# Patient Record
Sex: MALE | Race: WHITE | HISPANIC OR LATINO | Employment: FULL TIME | URBAN - METROPOLITAN AREA
[De-identification: names, ages, dates, MRNs, and addresses within clinical notes are randomized per-mention and may not be internally consistent; named-entity substitution may affect disease eponyms.]

---

## 2017-06-16 ENCOUNTER — HOSPITAL ENCOUNTER (OUTPATIENT)
Facility: MEDICAL CENTER | Age: 22
End: 2017-06-16
Attending: FAMILY MEDICINE
Payer: COMMERCIAL

## 2017-06-16 ENCOUNTER — HOSPITAL ENCOUNTER (OUTPATIENT)
Dept: RADIOLOGY | Facility: MEDICAL CENTER | Age: 22
End: 2017-06-16
Attending: FAMILY MEDICINE
Payer: COMMERCIAL

## 2017-06-16 ENCOUNTER — OFFICE VISIT (OUTPATIENT)
Dept: URGENT CARE | Facility: PHYSICIAN GROUP | Age: 22
End: 2017-06-16
Payer: COMMERCIAL

## 2017-06-16 VITALS
DIASTOLIC BLOOD PRESSURE: 70 MMHG | HEART RATE: 58 BPM | BODY MASS INDEX: 26.51 KG/M2 | RESPIRATION RATE: 16 BRPM | TEMPERATURE: 98.8 F | OXYGEN SATURATION: 98 % | SYSTOLIC BLOOD PRESSURE: 144 MMHG | WEIGHT: 190 LBS

## 2017-06-16 DIAGNOSIS — Z11.3 SCREENING FOR STD (SEXUALLY TRANSMITTED DISEASE): ICD-10-CM

## 2017-06-16 DIAGNOSIS — N50.812 PAIN IN LEFT TESTICLE: ICD-10-CM

## 2017-06-16 DIAGNOSIS — R30.0 DYSURIA: ICD-10-CM

## 2017-06-16 DIAGNOSIS — I86.1 LEFT VARICOCELE: ICD-10-CM

## 2017-06-16 PROCEDURE — 76870 US EXAM SCROTUM: CPT

## 2017-06-16 PROCEDURE — 99214 OFFICE O/P EST MOD 30 MIN: CPT | Performed by: FAMILY MEDICINE

## 2017-06-16 PROCEDURE — 87491 CHLMYD TRACH DNA AMP PROBE: CPT

## 2017-06-16 PROCEDURE — 87591 N.GONORRHOEAE DNA AMP PROB: CPT

## 2017-06-16 ASSESSMENT — ENCOUNTER SYMPTOMS
EYE REDNESS: 0
MYALGIAS: 0
EYE DISCHARGE: 0

## 2017-06-16 NOTE — MR AVS SNAPSHOT
Mino Valladares   2017 10:30 AM   Office Visit   MRN: [id number]    Department:  [facility]   Dept Phone:  [phone]    Description:  Male : 1995   Provider:  Michael Hernandez M.D.           Reason for Visit     Testicular Pain pt states has testicular pressure L side x 3 weeks      Allergies as of 2017     No Known Allergies      You were diagnosed with     Pain in left testicle   [369099]       Dysuria   [788.1.ICD-9-CM]       Screening for STD (sexually transmitted disease)   [235945]         Vital Signs     Blood Pressure Pulse Temperature Respirations Weight Oxygen Saturation    144/70 mmHg 58 37.1 °C (98.8 °F) 16 86.183 kg (190 lb) 98%    Smoking Status                   Never Smoker            Basic Information     Date Of Birth Sex Race Ethnicity Preferred Language    1995 Male  or   Origin (Armenian,Angolan,Equatorial Guinean,Andorran, etc) English      Your appointments     2017  2:30 PM   [facility]   [facility]ING [place] ([place]    [address]  [facility] [address]   [phone]              Problem List              ICD-10-CM Priority Class Noted - Resolved    No active medical problems [id number]   Unknown - Present    Left ankle injury S99.912A   3/3/2014 - Present      Health Maintenance        Date Due Completion Dates    IMM HEP B VACCINE (1 of 3 - Primary Series) 1993 ---    IMM HEP A VACCINE (1 of 2 - Standard Series) 1994 ---    IMM HPV VACCINE (1 of 3 - Male 3 Dose Series) 2004 ---    IMM VARICELLA (CHICKENPOX) VACCINE (1 of 2 - 2 Dose Adolescent Series) 2006 ---    IMM DTaP/Tdap/Td Vaccine (1 - Tdap) 2012 ---            Current Immunizations     No immunizations on file.      Below and/or attached are the medications your provider expects you to take. Review all of your home medications and newly ordered medications with your provider and/or pharmacist. Follow medication  instructions as directed by your provider and/or pharmacist. Please keep your medication list with you and share with your provider. Update the information when medications are discontinued, doses are changed, or new medications (including over-the-counter products) are added; and carry medication information at all times in the event of emergency situations     Allergies:  No Known Allergies          Medications  Valid as of: June 16, 2017 - 12:11 PM    Generic Name Brand Name Tablet Size Instructions for use    Amoxicillin (Tab) AMOXIL 875 MG Take 1 Tab by mouth 2 times a day.        Hydrocodone-Acetaminophen (Tab) NORCO 5-325 MG Take 1-2 Tabs by mouth every four hours as needed.        .                 Medicines prescribed today were sent to:     [facility] #[id number] - [place], [place] - [address]    [address] [facility]. [place] [address]    Phone: [phone] Fax: [phone]    Open 24 Hours?: No      Medication refill instructions:       If your prescription bottle indicates you have medication refills left, it is not necessary to call your provider’s office. Please contact your pharmacy and they will refill your medication.    If your prescription bottle indicates you do not have any refills left, you may request refills at any time through one of the following ways: The online [employer] system (except Urgent Care), by calling your provider’s office, or by asking your pharmacy to contact your provider’s office with a refill request. Medication refills are processed only during regular business hours and may not be available until the next business day. Your provider may request additional information or to have a follow-up visit with you prior to refilling your medication.   *Please Note: Medication refills are assigned a new Rx number when refilled electronically. Your pharmacy may indicate that no refills were authorized even though a new prescription for the same medication is available at the pharmacy. Please  request the medicine by name with the pharmacy before contacting your provider for a refill.        Your To Do List     Future Labs/Procedures Complete By Expires    CHLAMYDIA/GC PCR URINE OR SWAB  As directed 6/16/2018    [id number]  As directed 6/16/2018         "[employer] Access Code: [email or web address]  Expires: 7/16/2017 12:11 PM    "[employer]  A secure, online tool to manage your health information     [employer]’s "[employer]® is a secure, online tool that connects you to your personalized health information from the privacy of your home -- day or night - making it very easy for you to manage your healthcare. Once the activation process is completed, you can even access your medical information using the "[employer] britton, which is available for free in the Apple Britton store or Google Play store.     "[employer] provides the following levels of access (as shown below):   My Chart Features   [facility]  Specialists [facility]  Urgent  [facility]  Primary Care  Doctor   Email your healthcare team securely and privately 24/7 X X X    Manage appointments: schedule your next appointment; view details of past/upcoming appointments X      Request prescription refills. X      View recent personal medical records, including lab and immunizations X X X X   View health record, including health history, allergies, medications X X X X   Read reports about your outpatient visits, procedures, consult and ER notes X X X X   See your discharge summary, which is a recap of your hospital and/or ER visit that includes your diagnosis, lab results, and care plan. X X       How to register for "[employer]:  1. Go to  [email or web address].  2. Click on the Sign Up Now box, which takes you to the New Member Sign Up page. You will need to provide the following information:  a. Enter your "[employer] Access Code exactly as it appears at the top of this page. (You will not need to use this code after you’ve completed the  sign-up process. If you do not sign up before the expiration date, you must request a new code.)   b. Enter your date of birth.   c. Enter your home email address.   d. Click Submit, and follow the next screen’s instructions.  3. Create a [employer] ID. This will be your [employer] login ID and cannot be changed, so think of one that is secure and easy to remember.  4. Create a [employer] password. You can change your password at any time.  5. Enter your Password Reset Question and Answer. This can be used at a later time if you forget your password.   6. Enter your e-mail address. This allows you to receive e-mail notifications when new information is available in [employer].  7. Click Sign Up. You can now view your health information.    For assistance activating your [employer] account, call [phone]

## 2017-06-16 NOTE — PROGRESS NOTES
Subjective:      Mino Valladares is a 22 y.o. male who presents with Testicular Pain            HPI  3 weeks intermittent left testicle pain. Pressure character. 4/10 at worse. No trauma. No clear trigger. He did have unprotected sex approx 8 months ago and concerned for STD. No penis discharge. No blood in semen or urine. There is mild urinary burning. No fever.   No mass or swelling.     Review of Systems   Eyes: Negative for discharge and redness.   Musculoskeletal: Negative for myalgias and joint pain.   Skin: Negative for itching and rash.   .  Medications, Allergies, and current problem list reviewed today in Epic         Objective:     /70 mmHg  Pulse 58  Temp(Src) 37.1 °C (98.8 °F)  Resp 16  Wt 86.183 kg (190 lb)  SpO2 98%     Physical Exam   Constitutional: He appears well-developed and well-nourished. No distress.   Neurological:   Speech is clear. Patient is appropriate and cooperative.                 Assessment/Plan:     US: varicocele per radiology    1. Pain in left testicle  [id number]   2. Dysuria  POCT Urinalysis   3. Screening for STD (sexually transmitted disease)  CHLAMYDIA/GC PCR URINE OR SWAB   4. Left varicocele  REFERRAL TO UROLOGY     Differential diagnosis, natural history, supportive care, and indications for immediate follow-up discussed at length.   Will f/u labs

## 2017-06-19 LAB
C TRACH DNA SPEC QL NAA+PROBE: NEGATIVE
N GONORRHOEA DNA SPEC QL NAA+PROBE: NEGATIVE
SPECIMEN SOURCE: NORMAL

## 2018-03-05 ENCOUNTER — HOSPITAL ENCOUNTER (OUTPATIENT)
Dept: LAB | Facility: MEDICAL CENTER | Age: 23
End: 2018-03-05
Attending: FAMILY MEDICINE
Payer: COMMERCIAL

## 2018-03-05 ENCOUNTER — OFFICE VISIT (OUTPATIENT)
Dept: URGENT CARE | Facility: PHYSICIAN GROUP | Age: 23
End: 2018-03-05
Payer: COMMERCIAL

## 2018-03-05 VITALS
HEART RATE: 62 BPM | DIASTOLIC BLOOD PRESSURE: 80 MMHG | TEMPERATURE: 98.7 F | OXYGEN SATURATION: 99 % | RESPIRATION RATE: 16 BRPM | WEIGHT: 211 LBS | BODY MASS INDEX: 29.54 KG/M2 | HEIGHT: 71 IN | SYSTOLIC BLOOD PRESSURE: 138 MMHG

## 2018-03-05 DIAGNOSIS — R10.13 EPIGASTRIC ABDOMINAL PAIN: ICD-10-CM

## 2018-03-05 DIAGNOSIS — K85.90 ACUTE PANCREATITIS, UNSPECIFIED COMPLICATION STATUS, UNSPECIFIED PANCREATITIS TYPE: ICD-10-CM

## 2018-03-05 LAB
ALBUMIN SERPL BCP-MCNC: 4.7 G/DL (ref 3.2–4.9)
ALBUMIN/GLOB SERPL: 1.4 G/DL
ALP SERPL-CCNC: 64 U/L (ref 30–99)
ALT SERPL-CCNC: 19 U/L (ref 2–50)
ANION GAP SERPL CALC-SCNC: 9 MMOL/L (ref 0–11.9)
AST SERPL-CCNC: 19 U/L (ref 12–45)
BASOPHILS # BLD AUTO: 0.6 % (ref 0–1.8)
BASOPHILS # BLD: 0.04 K/UL (ref 0–0.12)
BILIRUB SERPL-MCNC: 0.6 MG/DL (ref 0.1–1.5)
BUN SERPL-MCNC: 17 MG/DL (ref 8–22)
CALCIUM SERPL-MCNC: 9.6 MG/DL (ref 8.5–10.5)
CHLORIDE SERPL-SCNC: 103 MMOL/L (ref 96–112)
CO2 SERPL-SCNC: 28 MMOL/L (ref 20–33)
CREAT SERPL-MCNC: 1 MG/DL (ref 0.5–1.4)
EOSINOPHIL # BLD AUTO: 0.15 K/UL (ref 0–0.51)
EOSINOPHIL NFR BLD: 2.3 % (ref 0–6.9)
ERYTHROCYTE [DISTWIDTH] IN BLOOD BY AUTOMATED COUNT: 41.9 FL (ref 35.9–50)
GLOBULIN SER CALC-MCNC: 3.3 G/DL (ref 1.9–3.5)
GLUCOSE SERPL-MCNC: 86 MG/DL (ref 65–99)
HCT VFR BLD AUTO: 47.9 % (ref 42–52)
HGB BLD-MCNC: 15.6 G/DL (ref 14–18)
IMM GRANULOCYTES # BLD AUTO: 0.01 K/UL (ref 0–0.11)
IMM GRANULOCYTES NFR BLD AUTO: 0.2 % (ref 0–0.9)
LIPASE SERPL-CCNC: 568 U/L (ref 11–82)
LYMPHOCYTES # BLD AUTO: 1.88 K/UL (ref 1–4.8)
LYMPHOCYTES NFR BLD: 28.6 % (ref 22–41)
MCH RBC QN AUTO: 30.5 PG (ref 27–33)
MCHC RBC AUTO-ENTMCNC: 32.6 G/DL (ref 33.7–35.3)
MCV RBC AUTO: 93.7 FL (ref 81.4–97.8)
MONOCYTES # BLD AUTO: 0.53 K/UL (ref 0–0.85)
MONOCYTES NFR BLD AUTO: 8.1 % (ref 0–13.4)
NEUTROPHILS # BLD AUTO: 3.96 K/UL (ref 1.82–7.42)
NEUTROPHILS NFR BLD: 60.2 % (ref 44–72)
NRBC # BLD AUTO: 0 K/UL
NRBC BLD-RTO: 0 /100 WBC
PLATELET # BLD AUTO: 249 K/UL (ref 164–446)
PMV BLD AUTO: 10.1 FL (ref 9–12.9)
POTASSIUM SERPL-SCNC: 4.1 MMOL/L (ref 3.6–5.5)
PROT SERPL-MCNC: 8 G/DL (ref 6–8.2)
RBC # BLD AUTO: 5.11 M/UL (ref 4.7–6.1)
SODIUM SERPL-SCNC: 140 MMOL/L (ref 135–145)
WBC # BLD AUTO: 6.6 K/UL (ref 4.8–10.8)

## 2018-03-05 PROCEDURE — 80053 COMPREHEN METABOLIC PANEL: CPT

## 2018-03-05 PROCEDURE — 99214 OFFICE O/P EST MOD 30 MIN: CPT | Performed by: FAMILY MEDICINE

## 2018-03-05 PROCEDURE — 85025 COMPLETE CBC W/AUTO DIFF WBC: CPT

## 2018-03-05 PROCEDURE — 83690 ASSAY OF LIPASE: CPT

## 2018-03-05 PROCEDURE — 36415 COLL VENOUS BLD VENIPUNCTURE: CPT

## 2018-03-05 RX ORDER — OMEPRAZOLE 20 MG/1
20 CAPSULE, DELAYED RELEASE ORAL 2 TIMES DAILY
Qty: 28 CAP | Refills: 0 | Status: SHIPPED | OUTPATIENT
Start: 2018-03-05 | End: 2018-03-19

## 2018-03-05 ASSESSMENT — PATIENT HEALTH QUESTIONNAIRE - PHQ9: CLINICAL INTERPRETATION OF PHQ2 SCORE: 0

## 2018-03-05 ASSESSMENT — ENCOUNTER SYMPTOMS
SENSORY CHANGE: 0
FOCAL WEAKNESS: 0
WEIGHT LOSS: 0

## 2018-03-05 NOTE — PROGRESS NOTES
"Subjective:      Mino Valladares is a 22 y.o. male who presents with Abdominal Pain (upper stomach pain with acid reflex)            1 week epigastric abdominal pain. Excessive belching. Acid reflux. Worse with eating and sleeping on stomach.  6-7/10. No radiation. No melena. No PMH upper gi problems. No melena. No fever. No jaundice. No EtOH. OTC tums with some relief.   No other aggravating or alleviating factors.           Review of Systems   Constitutional: Negative for malaise/fatigue and weight loss.   Neurological: Negative for sensory change and focal weakness.     .  Medications, Allergies, and current problem list reviewed today in Epic       Objective:     /80   Pulse 62   Temp 37.1 °C (98.7 °F)   Resp 16   Ht 1.803 m (5' 11\")   Wt 95.7 kg (211 lb)   SpO2 99%   BMI 29.43 kg/m²      Physical Exam   Constitutional: He appears well-developed and well-nourished. No distress.   HENT:   Head: Normocephalic and atraumatic.   Eyes: Conjunctivae are normal.   Cardiovascular: Normal rate, regular rhythm and normal heart sounds.    Pulmonary/Chest: Effort normal and breath sounds normal.   Abdominal: Soft. He exhibits no mass. There is tenderness (epigastric). There is no rebound and no guarding.   Neurological:   Speech is clear. Patient is appropriate and cooperative.     Skin: Skin is warm and dry. No rash noted.               Assessment/Plan:     1. Epigastric abdominal pain  COMP METABOLIC PANEL    LIPASE    CBC WITH DIFFERENTIAL    omeprazole (PRILOSEC) 20 MG delayed-release capsule     Differential diagnosis, natural history, supportive care, and indications for immediate follow-up discussed at length.   Suspect GERD  Will initiate PPI and f/u labs  If persistent will refer to GI for further evaluation.   "

## 2018-03-06 ENCOUNTER — HOSPITAL ENCOUNTER (EMERGENCY)
Facility: MEDICAL CENTER | Age: 23
End: 2018-03-06
Attending: EMERGENCY MEDICINE
Payer: COMMERCIAL

## 2018-03-06 ENCOUNTER — APPOINTMENT (OUTPATIENT)
Dept: RADIOLOGY | Facility: MEDICAL CENTER | Age: 23
End: 2018-03-06
Attending: EMERGENCY MEDICINE
Payer: COMMERCIAL

## 2018-03-06 VITALS
TEMPERATURE: 98 F | HEART RATE: 55 BPM | OXYGEN SATURATION: 98 % | SYSTOLIC BLOOD PRESSURE: 141 MMHG | DIASTOLIC BLOOD PRESSURE: 82 MMHG | RESPIRATION RATE: 16 BRPM

## 2018-03-06 DIAGNOSIS — K85.90 ACUTE PANCREATITIS, UNSPECIFIED COMPLICATION STATUS, UNSPECIFIED PANCREATITIS TYPE: ICD-10-CM

## 2018-03-06 LAB
ALBUMIN SERPL BCP-MCNC: 4.6 G/DL (ref 3.2–4.9)
ALBUMIN/GLOB SERPL: 1.4 G/DL
ALP SERPL-CCNC: 61 U/L (ref 30–99)
ALT SERPL-CCNC: 14 U/L (ref 2–50)
ANION GAP SERPL CALC-SCNC: 8 MMOL/L (ref 0–11.9)
AST SERPL-CCNC: 16 U/L (ref 12–45)
BASOPHILS # BLD AUTO: 0.3 % (ref 0–1.8)
BASOPHILS # BLD: 0.02 K/UL (ref 0–0.12)
BILIRUB SERPL-MCNC: 0.5 MG/DL (ref 0.1–1.5)
BUN SERPL-MCNC: 19 MG/DL (ref 8–22)
CALCIUM SERPL-MCNC: 9.3 MG/DL (ref 8.5–10.5)
CHLORIDE SERPL-SCNC: 105 MMOL/L (ref 96–112)
CO2 SERPL-SCNC: 26 MMOL/L (ref 20–33)
CREAT SERPL-MCNC: 0.91 MG/DL (ref 0.5–1.4)
EOSINOPHIL # BLD AUTO: 0.23 K/UL (ref 0–0.51)
EOSINOPHIL NFR BLD: 3.9 % (ref 0–6.9)
ERYTHROCYTE [DISTWIDTH] IN BLOOD BY AUTOMATED COUNT: 40 FL (ref 35.9–50)
GLOBULIN SER CALC-MCNC: 3.3 G/DL (ref 1.9–3.5)
GLUCOSE SERPL-MCNC: 88 MG/DL (ref 65–99)
HCT VFR BLD AUTO: 44.7 % (ref 42–52)
HGB BLD-MCNC: 15.4 G/DL (ref 14–18)
IMM GRANULOCYTES # BLD AUTO: 0.02 K/UL (ref 0–0.11)
IMM GRANULOCYTES NFR BLD AUTO: 0.3 % (ref 0–0.9)
LIPASE SERPL-CCNC: 393 U/L (ref 11–82)
LYMPHOCYTES # BLD AUTO: 1.6 K/UL (ref 1–4.8)
LYMPHOCYTES NFR BLD: 26.9 % (ref 22–41)
MCH RBC QN AUTO: 31.6 PG (ref 27–33)
MCHC RBC AUTO-ENTMCNC: 34.5 G/DL (ref 33.7–35.3)
MCV RBC AUTO: 91.8 FL (ref 81.4–97.8)
MONOCYTES # BLD AUTO: 0.51 K/UL (ref 0–0.85)
MONOCYTES NFR BLD AUTO: 8.6 % (ref 0–13.4)
NEUTROPHILS # BLD AUTO: 3.56 K/UL (ref 1.82–7.42)
NEUTROPHILS NFR BLD: 60 % (ref 44–72)
NRBC # BLD AUTO: 0 K/UL
NRBC BLD-RTO: 0 /100 WBC
PLATELET # BLD AUTO: 219 K/UL (ref 164–446)
PMV BLD AUTO: 9.9 FL (ref 9–12.9)
POTASSIUM SERPL-SCNC: 3.5 MMOL/L (ref 3.6–5.5)
PROT SERPL-MCNC: 7.9 G/DL (ref 6–8.2)
RBC # BLD AUTO: 4.87 M/UL (ref 4.7–6.1)
SODIUM SERPL-SCNC: 139 MMOL/L (ref 135–145)
WBC # BLD AUTO: 5.9 K/UL (ref 4.8–10.8)

## 2018-03-06 PROCEDURE — 83690 ASSAY OF LIPASE: CPT

## 2018-03-06 PROCEDURE — 76705 ECHO EXAM OF ABDOMEN: CPT

## 2018-03-06 PROCEDURE — 85025 COMPLETE CBC W/AUTO DIFF WBC: CPT

## 2018-03-06 PROCEDURE — 99284 EMERGENCY DEPT VISIT MOD MDM: CPT

## 2018-03-06 PROCEDURE — 80053 COMPREHEN METABOLIC PANEL: CPT

## 2018-03-06 ASSESSMENT — PAIN SCALES - GENERAL: PAINLEVEL_OUTOF10: 0

## 2018-03-07 NOTE — ED TRIAGE NOTES
"PCP sent pt due to pt possibly having GERD or \"pancreatitis.\" Pt denies pain, vomiting, diarrhea. C/o heartburn and bloating x1 week. Pt in no apparent distress. States intermittent nausea with frequent belching, denies at this time.   "

## 2018-03-07 NOTE — ED NOTES
"Pt states they are feeling \"ok.\" Updated on plan of care, pt expressed understanding. No other complaints at this time.     "

## 2018-03-07 NOTE — DISCHARGE INSTRUCTIONS
Acute Pancreatitis  Acute pancreatitis is a condition in which the pancreas suddenly becomes irritated and swollen (has inflammation). The pancreas is a gland that is located behind the stomach. It produces enzymes that help to digest food. The pancreas also releases the hormones glucagon and insulin, which help to regulate blood sugar. Damage to the pancreas occurs when the digestive enzymes from the pancreas are activated before they are released into the intestine.  Most acute attacks last a couple of days and can cause serious problems. Some people become dehydrated and develop low blood pressure. In severe cases, bleeding into the pancreas can lead to shock and can be life-threatening. The lungs, heart, and kidneys may fail.  What are the causes?  The most common causes of this condition are:  · Alcohol abuse.  · Gallstones.  Other causes include:  · Certain medicines.  · Exposure to certain chemicals.  · Infection.  · Damage caused by an accident (trauma).  · Abdominal surgery.  In some cases, the cause may not be known.  What are the signs or symptoms?  Symptoms of this condition include:  · Pain in the upper abdomen that may radiate to the back.  · Tenderness and swelling of the abdomen.  · Nausea and vomiting.  How is this diagnosed?  This condition may be diagnosed based on:  · A physical exam.  · Blood tests.  · Imaging tests, such as X-rays, CT scans, or an ultrasound of the abdomen.  How is this treated?  Treatment for this condition usually requires a stay in the hospital. Treatment may include:  · Pain medicine.  · Fluid replacement through an IV tube.  · Placing a tube in the stomach to remove stomach contents and to control vomiting (NG tube, or nasogastric tube).  · Not eating for 3-4 days. This gives the pancreas a rest, because enzymes are not being produced that can cause further damage.  · Antibiotic medicines, if your condition is caused by an infection.  · Surgery on the pancreas or  gallbladder.  Follow these instructions at home:  Eating and drinking  · Follow instructions from your health care provider about diet. This may involve avoiding alcohol and decreasing the amount of fat in your diet.  · Eat smaller, more frequent meals. This reduces the amount of digestive fluids that the pancreas produces.  · Drink enough fluid to keep your urine clear or pale yellow.  · Do not drink alcohol if it caused your condition.  General instructions  · Take over-the-counter and prescription medicines only as told by your health care provider.  · Do not use any tobacco products, such as cigarettes, chewing tobacco, and e-cigarettes. If you need help quitting, ask your health care provider.  · Get plenty of rest.  · If directed, check your blood sugar at home as told by your health care provider.  · Keep all follow-up visits as told by your health care provider. This is important.  Contact a health care provider if:  · You do not recover as quickly as expected.  · You develop new or worsening symptoms.  · You have persistent pain, weakness, or nausea.  · You recover and then have another episode of pain.  · You have a fever.  Get help right away if:  · You cannot eat or keep fluids down.  · Your pain becomes severe.  · Your skin or the white part of your eyes turns yellow (jaundice).  · You vomit.  · You feel dizzy or you faint.  · Your blood sugar is high (over 300 mg/dL).  This information is not intended to replace advice given to you by your health care provider. Make sure you discuss any questions you have with your health care provider.  Document Released: 12/18/2006 Document Revised: 04/26/2017 Document Reviewed: 09/20/2016  [employer] Patient Education © 2017 [employer]

## 2018-03-07 NOTE — ED NOTES
Nurse introduced self to pt. Updated on plan of care, what was to be expected. Pt expressed understanding. Call light within reach, siderail up x1 for safety.

## 2018-03-07 NOTE — ED PROVIDER NOTES
ED Provider Note    ED Provider Note      Primary care provider: Helen Kaur M.D.    CHIEF COMPLAINT  Chief Complaint   Patient presents with   • Gastrophageal Reflux   • Bloating       HPI  Mino Valladares is a 22 y.o. male who presents to the Emergency Department with chief complaint of upper epigastric abdominal pain. Patient was seen in urgent care yesterday diagnosed with indigestion discharged on omeprazole. Was called by urgent care today and directed go to the emergency department as his blood work revealed pancreatitis. Patient states he's had previous issues with blood in his stool states that this was caused by constipation exam no other GI issues portion of previous pancreas issues has had no gallbladder issues does report that he's had history of hypercholesterolemia. Patient states the pain is actually improving at this time no nausea vomiting he's had no aspiration diarrhea no dysuria no fevers no chills he does not drink heavily.    REVIEW OF SYSTEMS  10 systems reviewed and otherwise negative, pertinent positives and negatives listed in the history of present illness.    PAST MEDICAL HISTORY   has a past medical history of Left ankle injury (3/3/2014) and No active medical problems.    SURGICAL HISTORY   has a past surgical history that includes ankle orif (8/19/2010).    SOCIAL HISTORY  Social History   Substance Use Topics   • Smoking status: Never Smoker   • Smokeless tobacco: Never Used   • Alcohol use No      Comment: Stopped 2.5 months ago but was only socially before      History   Drug Use No     Comment: tried marijuana       FAMILY HISTORY  Non-Contributory    CURRENT MEDICATIONS  Home Medications     Reviewed by Qian Hart R.N. (Registered Nurse) on 03/06/18 at 1955  Med List Status: Complete   Medication Last Dose Status   amoxicillin (AMOXIL) 875 MG tablet  Active   hydrocodone-acetaminophen (NORCO) 5-325 MG TABS per tablet Not Taking Active   omeprazole (PRILOSEC) 20 MG  delayed-release capsule 3/6/2018 Active                ALLERGIES  No Known Allergies    PHYSICAL EXAM  VITAL SIGNS: /63   Pulse 74   Temp 36.9 °C (98.4 °F) (Temporal)   Resp 16   SpO2 98%   Pulse ox interpretation: I interpret this pulse ox as normal.  Constitutional: Alert and oriented x 3, minimal Distress  HEENT: Atraumatic normocephalic, pupils are equal round reactive to light extraocular movements are intact. The nares is clear, external ears are normal, mouth shows moist mucous membranes  Neck: Supple, no JVD no tracheal deviation  Cardiovascular: Regular rate and rhythm no murmur rub or gallop 2+ pulses peripherally x4  Thorax & Lungs: No respiratory distress, no wheezes rales or rhonchi, No chest tenderness.   GI: Minimal tenderness in epigastrium no rebound or guarding positive bowel sounds nondistended  Skin: Warm dry no acute rash or lesion  Musculoskeletal: Moving all extremities with full range and 5 of 5 strength, no acute  deformity  Neurologic: Cranial nerves III through XII are grossly intact, no sensory deficit, no cerebellar dysfunction   Psychiatric: Appropriate affect for situation at this time      DIAGNOSTIC STUDIES / PROCEDURES  LABS    Reviewed from urgent care yesterday.  Results for orders placed or performed during the hospital encounter of 03/05/18   COMP METABOLIC PANEL   Result Value Ref Range    Sodium 140 135 - 145 mmol/L    Potassium 4.1 3.6 - 5.5 mmol/L    Chloride 103 96 - 112 mmol/L    Co2 28 20 - 33 mmol/L    Anion Gap 9.0 0.0 - 11.9    Glucose 86 65 - 99 mg/dL    Bun 17 8 - 22 mg/dL    Creatinine 1.00 0.50 - 1.40 mg/dL    Calcium 9.6 8.5 - 10.5 mg/dL    AST(SGOT) 19 12 - 45 U/L    ALT(SGPT) 19 2 - 50 U/L    Alkaline Phosphatase 64 30 - 99 U/L    Total Bilirubin 0.6 0.1 - 1.5 mg/dL    Albumin 4.7 3.2 - 4.9 g/dL    Total Protein 8.0 6.0 - 8.2 g/dL    Globulin 3.3 1.9 - 3.5 g/dL    A-G Ratio 1.4 g/dL   LIPASE   Result Value Ref Range    Lipase 568 (H) 11 - 82 U/L   CBC  WITH DIFFERENTIAL   Result Value Ref Range    WBC 6.6 4.8 - 10.8 K/uL    RBC 5.11 4.70 - 6.10 M/uL    Hemoglobin 15.6 14.0 - 18.0 g/dL    Hematocrit 47.9 42.0 - 52.0 %    MCV 93.7 81.4 - 97.8 fL    MCH 30.5 27.0 - 33.0 pg    MCHC 32.6 (L) 33.7 - 35.3 g/dL    RDW 41.9 35.9 - 50.0 fL    Platelet Count 249 164 - 446 K/uL    MPV 10.1 9.0 - 12.9 fL    Neutrophils-Polys 60.20 44.00 - 72.00 %    Lymphocytes 28.60 22.00 - 41.00 %    Monocytes 8.10 0.00 - 13.40 %    Eosinophils 2.30 0.00 - 6.90 %    Basophils 0.60 0.00 - 1.80 %    Immature Granulocytes 0.20 0.00 - 0.90 %    Nucleated RBC 0.00 /100 WBC    Neutrophils (Absolute) 3.96 1.82 - 7.42 K/uL    Lymphs (Absolute) 1.88 1.00 - 4.80 K/uL    Monos (Absolute) 0.53 0.00 - 0.85 K/uL    Eos (Absolute) 0.15 0.00 - 0.51 K/uL    Baso (Absolute) 0.04 0.00 - 0.12 K/uL    Immature Granulocytes (abs) 0.01 0.00 - 0.11 K/uL    NRBC (Absolute) 0.00 K/uL   ESTIMATED GFR   Result Value Ref Range    GFR If African American >60 >60 mL/min/1.73 m 2    GFR If Non African American >60 >60 mL/min/1.73 m 2       Results for orders placed or performed during the hospital encounter of 03/06/18   CBC WITH DIFFERENTIAL   Result Value Ref Range    WBC 5.9 4.8 - 10.8 K/uL    RBC 4.87 4.70 - 6.10 M/uL    Hemoglobin 15.4 14.0 - 18.0 g/dL    Hematocrit 44.7 42.0 - 52.0 %    MCV 91.8 81.4 - 97.8 fL    MCH 31.6 27.0 - 33.0 pg    MCHC 34.5 33.7 - 35.3 g/dL    RDW 40.0 35.9 - 50.0 fL    Platelet Count 219 164 - 446 K/uL    MPV 9.9 9.0 - 12.9 fL    Neutrophils-Polys 60.00 44.00 - 72.00 %    Lymphocytes 26.90 22.00 - 41.00 %    Monocytes 8.60 0.00 - 13.40 %    Eosinophils 3.90 0.00 - 6.90 %    Basophils 0.30 0.00 - 1.80 %    Immature Granulocytes 0.30 0.00 - 0.90 %    Nucleated RBC 0.00 /100 WBC    Neutrophils (Absolute) 3.56 1.82 - 7.42 K/uL    Lymphs (Absolute) 1.60 1.00 - 4.80 K/uL    Monos (Absolute) 0.51 0.00 - 0.85 K/uL    Eos (Absolute) 0.23 0.00 - 0.51 K/uL    Baso (Absolute) 0.02 0.00 - 0.12 K/uL     Immature Granulocytes (abs) 0.02 0.00 - 0.11 K/uL    NRBC (Absolute) 0.00 K/uL   COMP METABOLIC PANEL   Result Value Ref Range    Sodium 139 135 - 145 mmol/L    Potassium 3.5 (L) 3.6 - 5.5 mmol/L    Chloride 105 96 - 112 mmol/L    Co2 26 20 - 33 mmol/L    Anion Gap 8.0 0.0 - 11.9    Glucose 88 65 - 99 mg/dL    Bun 19 8 - 22 mg/dL    Creatinine 0.91 0.50 - 1.40 mg/dL    Calcium 9.3 8.5 - 10.5 mg/dL    AST(SGOT) 16 12 - 45 U/L    ALT(SGPT) 14 2 - 50 U/L    Alkaline Phosphatase 61 30 - 99 U/L    Total Bilirubin 0.5 0.1 - 1.5 mg/dL    Albumin 4.6 3.2 - 4.9 g/dL    Total Protein 7.9 6.0 - 8.2 g/dL    Globulin 3.3 1.9 - 3.5 g/dL    A-G Ratio 1.4 g/dL   LIPASE   Result Value Ref Range    Lipase 393 (H) 11 - 82 U/L   ESTIMATED GFR   Result Value Ref Range    GFR If African American >60 >60 mL/min/1.73 m 2    GFR If Non African American >60 >60 mL/min/1.73 m 2           RADIOLOGY  No orders to display     The radiologist's interpretation of all radiological studies have been reviewed by me.    COURSE & MEDICAL DECISION MAKING  Pertinent Labs & Imaging studies reviewed. (See chart for details)    8:18 PM - Patient seen and examined at bedside.       Medical Decision Makin-year-old male diagnosed by her status yesterday at urgent care. Instructed to come here for follow-up after labs resulted. Since that time pains improved. Not tolerating by mouth intake. Labs here show improvement of his lipase ultrasound shows some fatty infiltration fibrosis liver without other acute change. At this point discussed admission with the patient however patient prefers not to be admitted and I'm agreeable to this as he is not requiring convex at this time is tolerating clear diet given instructions clear diet for the next 2 days advanced tolerated follow-up GI and primary care repeat exam benign repeat also has benign discharged home stable condition.    /82   Pulse (!) 55   Temp 36.7 °C (98 °F)   Resp 16   SpO2 98%      [facility] Kaur M.D.  [address]  [facility] [address] [phone]    Schedule an appointment as soon as possible for a visit      [facility]  [address] [address]  [phone]  Schedule an appointment as soon as possible for a visit      [facility], Emergency Dept  [address] [facility] [address] [phone]    in 12-24 hours if symptoms persist,, immediately if symptoms worsen        FINAL IMPRESSION  1. Acute pancreatitis, unspecified complication status, unspecified pancreatitis type          This dictation has been created using voice recognition software and/or scribes. The accuracy of the dictation is limited by the abilities of the software and the expertise of the scribes. I expect there may be some errors of grammar and possibly content. I made every attempt to manually correct the errors within my dictation. However, errors related to voice recognition software and/or scribes may still exist and should be interpreted within the appropriate context.

## 2018-03-07 NOTE — PROGRESS NOTES
Lipase 568    Patient is doing much better today with decreased pain. No fever. No jaundice. Discussed ER and will hold based on improvement.     Will check Lipase tomorrow and determine outpatient vs inpatient f/u at that time.     He understands to ER with worsening pain, fever, or jaundice.

## 2019-08-30 ENCOUNTER — OFFICE VISIT (OUTPATIENT)
Dept: MEDICAL GROUP | Facility: PHYSICIAN GROUP | Age: 24
End: 2019-08-30
Payer: COMMERCIAL

## 2019-08-30 VITALS
HEART RATE: 66 BPM | OXYGEN SATURATION: 96 % | BODY MASS INDEX: 27.86 KG/M2 | HEIGHT: 71 IN | TEMPERATURE: 98.8 F | WEIGHT: 199 LBS | SYSTOLIC BLOOD PRESSURE: 118 MMHG | DIASTOLIC BLOOD PRESSURE: 90 MMHG | RESPIRATION RATE: 16 BRPM

## 2019-08-30 DIAGNOSIS — K62.5 RECTAL BLEEDING: ICD-10-CM

## 2019-08-30 PROCEDURE — 99214 OFFICE O/P EST MOD 30 MIN: CPT | Performed by: INTERNAL MEDICINE

## 2019-08-30 NOTE — ASSESSMENT & PLAN NOTE
First had symptoms about 6 months ago. Thinks his rectal bleeding is related to constipation. Constipation has been ongoing for over a year. For the last week he's been having rectal bleeding. Notes blood in the bowl. His stool is normal, blood drips after a bowel movement. Has 4/10 pain with bowel movements, pain is quick. In the past he's been examined and has had a tear. Hasn't been evaluated with a gastroenterologist. He works [employer] as a  in a facility about 4 hours away. Has been trying to eat healthy (salads, water) and tries to avoid bread, sweets.

## 2019-08-30 NOTE — PATIENT INSTRUCTIONS
Start with miralax and senna over the counter for 1-2 weeks to help achieve softer stools. Also slowly increase fiber to 25-30 grams to help with constipation. May slowly decrease miralax and senna over the next couple weeks as your fiber intake increases. May use preparation H over the counter to help with pain

## 2019-08-30 NOTE — PROGRESS NOTES
PRIMARY CARE CLINIC FOLLOW UP VISIT  Chief Complaint   Patient presents with   • Rectal Bleeding     History of Present Illness     Rectal bleeding  First had symptoms about 6 months ago. Thinks his rectal bleeding is related to constipation. Constipation has been ongoing for over a year. For the last week he's been having rectal bleeding. Notes blood in the bowl. His stool is normal, blood drips after a bowel movement. Has 4/10 pain with bowel movements, pain is quick. In the past he's been examined and has had a tear. Hasn't been evaluated with a gastroenterologist. He works [employer] as a  in a facility about 4 hours away. Has been trying to eat healthy (salads, water) and tries to avoid bread, sweets.     Current Outpatient Medications   Medication Sig Dispense Refill   • Suppository Base Misc by Does not apply route.       No current facility-administered medications for this visit.      Past Medical History:   Diagnosis Date   • Left ankle injury 3/3/2014   • No active medical problems      Past Surgical History:   Procedure Laterality Date   • ANKLE ORIF  8/19/2010    Performed by BRENDA BURTON at SURGERY [facility]     Social History     Tobacco Use   • Smoking status: Light Tobacco Smoker   • Smokeless tobacco: Never Used   Substance Use Topics   • Alcohol use: No     Comment: Stopped 2.5 months ago but was only socially before   • Drug use: No     Comment: tried marijuana     Social History     Social History Narrative   • Not on file     Family History   Problem Relation Age of Onset   • Psychiatric Illness Mother         depression   • Cancer Mother         breast cancer     Family Status   Relation Name Status   • Mo  Alive   • Fa  Alive   • Sis  Alive   • Bro  Alive        half brother     Allergies: Patient has no known allergies.    ROS  As per HPI above. All other systems reviewed and negative.        Objective   /90 (BP Cuff Size: Large adult)   Pulse 66   Temp  "37.1 °C (98.8 °F)   Resp 16   Ht 1.803 m (5' 11\")   Wt 90.3 kg (199 lb)   SpO2 96%  Body mass index is 27.75 kg/m².    General: alert and oriented, pleasant, cooperative  HEENT: Normocephalic, atraumatic.   ARIADNA: no blood noted on gloved finger, unable to visualize fissure/tear   Psychiatric: appropriate mood and affect. Good insight and appropriate judgment       Assessment and Plan   The following treatment plan was discussed     1. Rectal bleeding  May have internal hemorrhoids/tear. Advised miralax and senna for a week or two with a slow taper, meanwhile increasing fiber to target goal of 25-30 grams of fiber daily. Also may apply preparation H for topical relief. If no improvement in the next 4-6 weeks then follow up with GI.   - REFERRAL TO GASTROENTEROLOGY      Healthcare maintenance     Health Maintenance Due   Topic Date Due   • IMM PNEUMOCOCCAL VACCINE: 0-64 Years (1 of 1 - PPSV23) 06/14/1999   • IMM VARICELLA (CHICKENPOX) VACCINE (1 of 2 - 13+ 2-dose series) 06/14/2006   • IMM DTaP/Tdap/Td Vaccine (1 - Tdap) 06/14/2012   • IMM INFLUENZA (1) 09/01/2019       Return if symptoms worsen or fail to improve.    Shakeel Simpson MD  Internal Medicine  [facility]                   "

## 2022-03-02 ENCOUNTER — OFFICE VISIT (OUTPATIENT)
Dept: MEDICAL GROUP | Facility: PHYSICIAN GROUP | Age: 27
End: 2022-03-02
Payer: COMMERCIAL

## 2022-03-02 VITALS
BODY MASS INDEX: 31.08 KG/M2 | OXYGEN SATURATION: 95 % | TEMPERATURE: 99 F | HEART RATE: 64 BPM | DIASTOLIC BLOOD PRESSURE: 72 MMHG | HEIGHT: 71 IN | RESPIRATION RATE: 18 BRPM | WEIGHT: 222 LBS | SYSTOLIC BLOOD PRESSURE: 124 MMHG

## 2022-03-02 DIAGNOSIS — Z23 NEED FOR VACCINATION: ICD-10-CM

## 2022-03-02 DIAGNOSIS — E66.3 OVERWEIGHT: ICD-10-CM

## 2022-03-02 DIAGNOSIS — Z00.00 WELL ADULT EXAM: ICD-10-CM

## 2022-03-02 DIAGNOSIS — K51.219 ULCERATIVE PROCTITIS WITH COMPLICATION (HCC): ICD-10-CM

## 2022-03-02 DIAGNOSIS — Z76.89 ENCOUNTER TO ESTABLISH CARE WITH NEW DOCTOR: ICD-10-CM

## 2022-03-02 PROBLEM — K51.20 ULCERATIVE PROCTITIS (HCC): Status: ACTIVE | Noted: 2022-03-02

## 2022-03-02 PROBLEM — K62.5 RECTAL BLEEDING: Status: RESOLVED | Noted: 2019-08-30 | Resolved: 2022-03-02

## 2022-03-02 PROCEDURE — 90732 PPSV23 VACC 2 YRS+ SUBQ/IM: CPT | Performed by: INTERNAL MEDICINE

## 2022-03-02 PROCEDURE — 90715 TDAP VACCINE 7 YRS/> IM: CPT | Performed by: INTERNAL MEDICINE

## 2022-03-02 PROCEDURE — 90472 IMMUNIZATION ADMIN EACH ADD: CPT | Performed by: INTERNAL MEDICINE

## 2022-03-02 PROCEDURE — 99395 PREV VISIT EST AGE 18-39: CPT | Mod: 25 | Performed by: INTERNAL MEDICINE

## 2022-03-02 PROCEDURE — 90471 IMMUNIZATION ADMIN: CPT | Performed by: INTERNAL MEDICINE

## 2022-03-02 ASSESSMENT — PATIENT HEALTH QUESTIONNAIRE - PHQ9: CLINICAL INTERPRETATION OF PHQ2 SCORE: 0

## 2022-03-02 NOTE — ASSESSMENT & PLAN NOTE
Chronic condition.  The patient currently not on any therapy at the present time.  Patient followed by GI specialist at [facility].  Patient denies fever chills abdominal pain or GI bleeding recently.

## 2022-03-02 NOTE — PROGRESS NOTES
"PRIMARY CARE CLINIC VISIT  Chief Complaint   Patient presents with   • Establish Care     History of ulcerative proctitis.    History of Present Illness     Encounter to establish care with new doctor  Patient present today to establish care with new primary care provider.  Patient feels well without any specific complaint at this time.    Ulcerative proctitis (HCC)  Chronic condition.  The patient currently not on any therapy at the present time.  Patient followed by GI specialist at .  Patient denies fever chills abdominal pain or GI bleeding recently.    Overweight  This is chronic condition.   Pt is aware of elevated BMI.  Brief discussion with the patient regarding diet, exercise, and lifestyle modification to help achieve and maintain healthy weight          No current outpatient medications on file prior to visit.     No current facility-administered medications on file prior to visit.        Allergies: Patient has no known allergies.    ROS  As per HPI above. All other systems reviewed and negative.      Past Medical, Social, and Family history reviewed and updated in EPIC     Objective     /72   Pulse 64   Temp 37.2 °C (99 °F) (Temporal)   Resp 18   Ht 1.803 m (5' 11\")   Wt 101 kg (222 lb)   SpO2 95%    Body mass index is 30.96 kg/m².    General: alert and oriented  Cardiovascular: regular rate and rhythm  Pulmonary: lungs : no wheezing   Gastrointestinal: BS present. No obvious mass noted          Assessment and Plan     1. Encounter to establish care with new doctor      2. Ulcerative proctitis with complication (HCC)  Chronic condition.  Presently the patient is asymptomatic.  Patient presently not on any medical therapy   Recommend patient to continue follow-up with GI specialist as directed.      3. Need for vaccination  - Pneumovax Vaccine (PPSV23)  - Tdap Vaccine =>8YO IM    4. Well adult exam  Routine lab work ordered.  Advised the patient to follow-up after lab work " done  - HEMOGLOBIN A1C; Future  - ALANINE AMINO-TRANS; Future  - Basic Metabolic Panel; Future  - CBC WITHOUT DIFFERENTIAL; Future  - Lipid Profile; Future  - TSH; Future    5. Overweight  Advised the patient regarding diet and exercise.  Patient will try to lose some weight               Please note that this dictation was created using voice recognition software. I have made every reasonable attempt to correct obvious errors but there may be errors of grammar and content that I may have overlooked prior to finalization of this note.      He Zaldivar MD  Internal Medicine  [facility]

## 2022-03-02 NOTE — ASSESSMENT & PLAN NOTE
Patient present today to establish care with new primary care provider.  Patient feels well without any specific complaint at this time.

## 2022-03-02 NOTE — LETTER
[facility]  He Zaldivar M.D.  [address]  [facility] [address]  Fax: [phone]   Authorization for Release/Disclosure of   Protected Health Information   Name: MINO VALLADARES : 1995 SSN: [id number]   Address: [facility] [address]  [facility] [address] Phone:    [phone] (home)    I authorize the entity listed below to release/disclose the PHI below to:   [facility]/He Zaldivar M.D. and He Zaldivar M.D.   Provider or Entity Name:  [facility]    Address   City, State, Zip   Phone:      Fax:     Reason for request: continuity of care   Information to be released:    [  ] LAST COLONOSCOPY,  including any PATH REPORT and follow-up  [  ] LAST FIT/COLOGUARD RESULT [  ] LAST DEXA  [  ] LAST MAMMOGRAM  [  ] LAST PAP  [  ] LAST LABS [  ] RETINA EXAM REPORT  [  ] IMMUNIZATION RECORDS  [XX  ] Release all info      [  ] Check here and initial the line next to each item to release ALL health information INCLUDING  _____ Care and treatment for drug and / or alcohol abuse  _____ HIV testing, infection status, or AIDS  _____ Genetic Testing    DATES OF SERVICE OR TIME PERIOD TO BE DISCLOSED: _____________  I understand and acknowledge that:  * This Authorization may be revoked at any time by you in writing, except if your health information has already been used or disclosed.  * Your health information that will be used or disclosed as a result of you signing this authorization could be re-disclosed by the recipient. If this occurs, your re-disclosed health information may no longer be protected by State or Federal laws.  * You may refuse to sign this Authorization. Your refusal will not affect your ability to obtain treatment.  * This Authorization becomes effective upon signing and will  on (date) __________.      If no date is indicated, this Authorization will  one (1) year from the signature date.    Name: Mino Valladares    Signature:   Date:     3/2/2022       PLEASE FAX  REQUESTED RECORDS BACK TO: [phone]

## 2022-03-04 ENCOUNTER — HOSPITAL ENCOUNTER (OUTPATIENT)
Dept: LAB | Facility: MEDICAL CENTER | Age: 27
End: 2022-03-04
Attending: INTERNAL MEDICINE
Payer: COMMERCIAL

## 2022-03-04 DIAGNOSIS — Z00.00 WELL ADULT EXAM: ICD-10-CM

## 2022-03-04 LAB
ALT SERPL-CCNC: 17 U/L (ref 2–50)
ANION GAP SERPL CALC-SCNC: 10 MMOL/L (ref 7–16)
BUN SERPL-MCNC: 14 MG/DL (ref 8–22)
CALCIUM SERPL-MCNC: 9.8 MG/DL (ref 8.5–10.5)
CHLORIDE SERPL-SCNC: 103 MMOL/L (ref 96–112)
CHOLEST SERPL-MCNC: 222 MG/DL (ref 100–199)
CO2 SERPL-SCNC: 27 MMOL/L (ref 20–33)
CREAT SERPL-MCNC: 0.86 MG/DL (ref 0.5–1.4)
ERYTHROCYTE [DISTWIDTH] IN BLOOD BY AUTOMATED COUNT: 42.3 FL (ref 35.9–50)
EST. AVERAGE GLUCOSE BLD GHB EST-MCNC: 108 MG/DL
FASTING STATUS PATIENT QL REPORTED: NORMAL
GLUCOSE SERPL-MCNC: 91 MG/DL (ref 65–99)
HBA1C MFR BLD: 5.4 % (ref 4–5.6)
HCT VFR BLD AUTO: 47 % (ref 42–52)
HDLC SERPL-MCNC: 45 MG/DL
HGB BLD-MCNC: 16 G/DL (ref 14–18)
LDLC SERPL CALC-MCNC: 153 MG/DL
MCH RBC QN AUTO: 31.3 PG (ref 27–33)
MCHC RBC AUTO-ENTMCNC: 34 G/DL (ref 33.7–35.3)
MCV RBC AUTO: 92 FL (ref 81.4–97.8)
PLATELET # BLD AUTO: 237 K/UL (ref 164–446)
PMV BLD AUTO: 9.8 FL (ref 9–12.9)
POTASSIUM SERPL-SCNC: 4.5 MMOL/L (ref 3.6–5.5)
RBC # BLD AUTO: 5.11 M/UL (ref 4.7–6.1)
SODIUM SERPL-SCNC: 140 MMOL/L (ref 135–145)
TRIGL SERPL-MCNC: 118 MG/DL (ref 0–149)
TSH SERPL DL<=0.005 MIU/L-ACNC: 2.92 UIU/ML (ref 0.38–5.33)
WBC # BLD AUTO: 5.6 K/UL (ref 4.8–10.8)

## 2022-03-04 PROCEDURE — 84443 ASSAY THYROID STIM HORMONE: CPT

## 2022-03-04 PROCEDURE — 85027 COMPLETE CBC AUTOMATED: CPT

## 2022-03-04 PROCEDURE — 84460 ALANINE AMINO (ALT) (SGPT): CPT

## 2022-03-04 PROCEDURE — 80048 BASIC METABOLIC PNL TOTAL CA: CPT

## 2022-03-04 PROCEDURE — 83036 HEMOGLOBIN GLYCOSYLATED A1C: CPT

## 2022-03-04 PROCEDURE — 80061 LIPID PANEL: CPT

## 2022-03-04 PROCEDURE — 36415 COLL VENOUS BLD VENIPUNCTURE: CPT

## 2022-10-25 ENCOUNTER — OFFICE VISIT (OUTPATIENT)
Dept: MEDICAL GROUP | Facility: PHYSICIAN GROUP | Age: 27
End: 2022-10-25
Payer: COMMERCIAL

## 2022-10-25 VITALS
HEART RATE: 69 BPM | WEIGHT: 225 LBS | DIASTOLIC BLOOD PRESSURE: 88 MMHG | SYSTOLIC BLOOD PRESSURE: 136 MMHG | HEIGHT: 71 IN | TEMPERATURE: 99.2 F | BODY MASS INDEX: 31.5 KG/M2 | OXYGEN SATURATION: 97 % | RESPIRATION RATE: 16 BRPM

## 2022-10-25 DIAGNOSIS — G89.29 CHRONIC PAIN OF LEFT ANKLE: ICD-10-CM

## 2022-10-25 DIAGNOSIS — E78.5 DYSLIPIDEMIA: ICD-10-CM

## 2022-10-25 DIAGNOSIS — K51.219 ULCERATIVE PROCTITIS WITH COMPLICATION (HCC): ICD-10-CM

## 2022-10-25 DIAGNOSIS — Z23 NEED FOR VACCINATION: ICD-10-CM

## 2022-10-25 DIAGNOSIS — M25.572 CHRONIC PAIN OF LEFT ANKLE: ICD-10-CM

## 2022-10-25 PROCEDURE — 99214 OFFICE O/P EST MOD 30 MIN: CPT | Mod: 25 | Performed by: INTERNAL MEDICINE

## 2022-10-25 PROCEDURE — 90471 IMMUNIZATION ADMIN: CPT | Performed by: INTERNAL MEDICINE

## 2022-10-25 PROCEDURE — 90686 IIV4 VACC NO PRSV 0.5 ML IM: CPT | Performed by: INTERNAL MEDICINE

## 2022-10-25 RX ORDER — MESALAMINE 1.2 G/1
1.2 TABLET, DELAYED RELEASE ORAL DAILY
COMMUNITY
Start: 2022-10-12

## 2022-10-26 NOTE — ASSESSMENT & PLAN NOTE
Chronic condition for the patient followed by digestive health specialist.  Patient stated that he was seen by GI specialist last month patient currently taking mesalamine.  His condition is stable.  The patient denies nausea vomiting abdominal pain GI bleeding.

## 2022-10-26 NOTE — ASSESSMENT & PLAN NOTE
HPI/History  Melany Mcardle is a 48 y.o.  female who presents for evaluation. Pt reports intermittent discomfort and tenderness around RSB since March. Initially thought it was a muscle pull when skiiing but is not getting much better. Remains intermittent and is slightly tender to palpation and also slightly sore with certain movements or posture/positioning. She sleeps on her side, right > left, and tries to hug a pillow for support. Reports back issues prevents from lying on back and uncomfortable to lay prone. She has not tried NSAIDs as she has migraines which had been giving her issues and she tried to stop most analgesics. She does not want to \"rock the boat\" by using/restarting OTCs such as NSAIDs or tylenol. She denies any actual breast or nipple sxs. No cardiopulmonary sxs. She has a hx of DJD and osteopenia and reports some mild generalized joint aches on occasion which is a concern for her. No other sxs or complaints. Patient Active Problem List   Diagnosis Code    Bulimia Dr. Callejas Stable Dr. Vicente Aguillon G43.909    DJD knees Dr. Ariel Mccabe M19.90    Osteopenia 2/13 FRAX 3.0 and 0.3  M85.80    GERD without esophagitis K21.9    Bilateral leg edema R60.0    Personal history of tobacco use, presenting hazards to health Z87.891    Asymptomatic varicose veins I83.90     Past Medical History:   Diagnosis Date    Alcohol abuse     X2-3 yrs    Bulimia     X20 years Dr. Kyra Holley;   [facility] 2011(?)    Constipation     Degenerative arthritis of knee     knees Dr. Rupali Mcneill Degenerative disc disease, cervical 2009    C5-6 Dr Melissa Toledo disorder     [address] Gastritis 12/12    GERD (gastroesophageal reflux disease) 2009    Mana Perez, last EGD 12/12    H/O bone graft 05/22/2017    Dental bone graft for dental implant    Headache     Migraines Dr. Terri Mansfield, saw Dr. Jeaneth Fernando also; now seeing Dr Vicente Aguillon for botox    Hydronephrosis of right kidney This is a chronic condition.  The patient stated that he had left ankle surgery done approximately 10 years ago.  Within the last several months the pain has becoming more noticeable especially with prolonged walking/standing.  Patient denies recent trauma or injury.   Dr Monroy Care Hypothyroidism     Dr Sujata Wolfe (??)    Osteopenia 2/13 FRAX 3.0 and 0.3      DEXA t score 0.4 spine, -1.6 hip (2/13); -0.3 spine, -1.5 hip w FRAX 3.4/0.3 (4/15); w/u neg for secondary causes    PPD positive, treated     age 6    Sleep apnea 2011? on cpap although she's not using Dr. Ema Gonzalez Thyroid nodule 2014    Dr García Side left sided     Past Surgical History:   Procedure Laterality Date    HX ABDOMINOPLASTY  2004    and mastopexy, Dr. Elif Tan  [address]    silicone Dr Adis Rosa  8/12    Dr. Vázquez Bobill Webb 9/16 negative    HX GI  12/12    EGD w gastritis, h pylori neg Dr. Vega Guardian HX GYN  [facility] X 2    IMPLANT BREAST [address]      [place] 2004?     NEUROLOGICAL PROCEDURE UNLISTED      EMG negative Dr. 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     Social History Main Topics    Smoking status: Former Smoker     Quit date: 1/1/1987    Smokeless tobacco: Never Used    Alcohol use 0.0 oz/week     0 Standard drinks or equivalent per week      Comment: abuse-has not drank in four years    Drug use: No      Comment: Former use a long time ago   Heath Garcia Sexual activity: Not on file     Other Topics Concern    Not on file     Social History Narrative     Family History   Problem Relation Age of Onset    Diabetes Mother     Hypertension Mother     Alcohol abuse Mother     Liver Disease Mother     Depression Sister     Obesity Sister     Cancer Maternal Grandmother      lung    Heart Disease Maternal Grandmother      Current Outpatient Prescriptions   Medication Sig    fluticasone (FLONASE) 50 mcg/actuation nasal spray 2 Sprays by Both Nostrils route daily.  propranolol LA (INDERAL LA) 160 mg capsule TAKE 1 CAPSULE BY MOUTH DAILY    DEXILANT 60 mg CpDB TAKE 1 CAPSULE BY MOUTH DAILY    rizatriptan (MAXALT) 10 mg tablet Take 1 Tab by mouth once as needed for Migraine for up to 1 dose. May repeat in 2 hours if needed    PROGESTERONE 400 mg by Does Not Apply route. In the pm    Cetirizine 10 mg cap Take  by mouth.  acetaminophen (TYLENOL) 325 mg tablet Take  by mouth every four (4) hours as needed for Pain.  ibuprofen (MOTRIN) 200 mg tablet Take  by mouth.  estradiol (MINIVELLE) 0.0375 mg/24 hr 1 Patch by TransDERmal route every Monday and Friday.  OMEPRAZOLE MAGNESIUM (PRILOSEC OTC PO) Take  by mouth.  melatonin 3 mg tablet Take  by mouth.  thyroid, Pork, (ARMOUR THYROID) 60 mg tablet Take 60 mg by mouth daily.  docusate sodium (COLACE) 100 mg capsule Take 1,600 mg by mouth two (2) times a day. 6 in the am and 10 at night    CALCIUM PO Take 500 mg by mouth three (3) times daily.  Cholecalciferol, Vitamin D3, (VITAMIN D) 2,000 unit Cap Take 1,000 Units by mouth. No current facility-administered medications for this visit. Allergies   Allergen Reactions    Lexapro [Escitalopram] Other (comments)     Sexual prob       Review of Systems  Aside from those included in HPI, remainder of complete ROS negative. Physical Examination  Visit Vitals    /70    Pulse 68    Temp 98.7 °F (37.1 °C) (Oral)    Ht 5' 5\" (1.651 m)    SpO2 98%   Refused weight-in but reports weight gain. General - Alert and in no acute distress. Pt appears well, comfortable, and in good spirits. Pleasant, engaging. Nontoxic. Not anxious, non-diaphoretic. Mental status - Appropriate mood, behavior, speech content, dress, and thought processes. Pulm - No tachypnea, retractions, or cyanosis. Good respiratory effort. Clear to auscultation bilat. No appreciable wheezes, rales, or rhonchi. Cardiovascular - Normal rate, regular rhythm. Chest wall - Indicates mid RSB/rib region as affected area. Mild tenderness verbalized with palpation. No palpable deformities. No significant tenderness with muscle engagement or extremity movements. No skin or other findings. Assessment and Plan  1. Right chest wall pain around mid RSB most consistent with costochondritis or similar. However, will check chest and rib XR. She may reconsider NSAIDs in the future but does not wish to use currently. She may consider PT in the future. She has a previously scheduled appt with Dr. Gretel Villalobos next month and can f/u at that time, sooner if needed. He may consider labs regarding her occasional generalized joint aches but I will leave this to him. Further planning as warranted. Pt happily agrees with plan. PLEASE NOTE:   This document has been produced using voice recognition software. Unrecognized errors in transcription may be present.     Esperanza Worrell [employer] [facility]  [phone]  6/12/2017

## 2022-10-26 NOTE — PROGRESS NOTES
"PRIMARY CARE CLINIC VISIT    Chief complaint:  Left ankle pain   flu shot  Follow-up      History of Present Illness     Ulcerative proctitis (HCC)  Chronic condition for the patient followed by digestive health specialist.  Patient stated that he was seen by GI specialist last month patient currently taking mesalamine.  His condition is stable.  The patient denies nausea vomiting abdominal pain GI bleeding.    Dyslipidemia  Chronic condition.  The patient presently not on any therapy.  Recommend diet and exercise.    Left ankle pain  This is a chronic condition.  The patient stated that he had left ankle surgery done approximately 10 years ago.  Within the last several months the pain has becoming more noticeable especially with prolonged walking/standing.  Patient denies recent trauma or injury.    Current Outpatient Medications on File Prior to Visit   Medication Sig Dispense Refill    mesalamine (LIALDA) 1.2 GM Tablet Delayed Response Take 1.2 g by mouth every day.       No current facility-administered medications on file prior to visit.        Allergies: Patient has no known allergies.    ROS  As per HPI above. All other systems reviewed and negative.      Past Medical, Social, and Family history reviewed and updated in EPIC     Objective     /88 (BP Location: Left arm, Patient Position: Sitting, BP Cuff Size: Adult)   Pulse 69   Temp 37.3 °C (99.2 °F) (Temporal)   Resp 16   Ht 1.803 m (5' 11\")   Wt 102 kg (225 lb)   SpO2 97%    Body mass index is 31.38 kg/m².    General: alert in no apparent distress.  Cardiovascular: regular rate and rhythm  Pulmonary: lungs : no wheezing   Gastrointestinal: BS present. No obvious mass noted  Left ankle no significant swelling redness or deformity.  Range of motion is mildly restricted due to pain especially with ankle flexion and extension  Peripheral pulses present at dorsalis pedis and posterior tibialis.  No pain or swelling noted in the Achilles " tendon.      Assessment and Plan     1. Need for vaccination  - INFLUENZA VACCINE QUAD INJ (PF)    2. Chronic pain of left ankle.  Patient status post surgery 10 years ago.  Per patient report the pain has gotten worse in the last few months.  - DX-ANKLE 3+ VIEWS LEFT; Future  - Referral to Orthopedics    3. Dyslipidemia  Chronic condition.  Recommend diet and exercise.  Lab tests ordered.  - Lipid Profile; Future    4. Ulcerative proctitis with complication (HCC)  Chronic stable condition.  Continue with mesalamine.  Continue follow-up with GI specialist as directed.                           Healthcare Maintenance     Health Maintenance Due   Topic Date Due    COVID-19 Vaccine (3 - Booster for Christo series) 05/14/2022    IMM INFLUENZA (1) 09/01/2022               Please note that this dictation was created using voice recognition software. I have made every reasonable attempt to correct obvious errors, but I expect that there are errors of grammar and possibly content that I did not discover before finalizing the note.    He Zaldivar MD  Internal Medicine  [facility]

## 2022-11-01 ENCOUNTER — HOSPITAL ENCOUNTER (OUTPATIENT)
Dept: RADIOLOGY | Facility: MEDICAL CENTER | Age: 27
End: 2022-11-01
Attending: INTERNAL MEDICINE
Payer: COMMERCIAL

## 2022-11-01 DIAGNOSIS — G89.29 CHRONIC PAIN OF LEFT ANKLE: ICD-10-CM

## 2022-11-01 DIAGNOSIS — M25.572 CHRONIC PAIN OF LEFT ANKLE: ICD-10-CM

## 2022-11-01 PROCEDURE — 73610 X-RAY EXAM OF ANKLE: CPT | Mod: LT

## 2023-09-02 LAB
CHOLEST SERPL-MCNC: 249 MG/DL (ref 100–199)
HDLC SERPL-MCNC: 46 MG/DL
LABORATORY COMMENT REPORT: ABNORMAL
LDLC SERPL CALC-MCNC: 169 MG/DL (ref 0–99)
TRIGL SERPL-MCNC: 183 MG/DL (ref 0–149)
VLDLC SERPL CALC-MCNC: 34 MG/DL (ref 5–40)

## 2023-09-04 PROBLEM — E78.5 DYSLIPIDEMIA: Chronic | Status: ACTIVE | Noted: 2022-10-25

## 2023-09-21 ENCOUNTER — OFFICE VISIT (OUTPATIENT)
Dept: MEDICAL GROUP | Facility: PHYSICIAN GROUP | Age: 28
End: 2023-09-21
Payer: COMMERCIAL

## 2023-09-21 VITALS
HEART RATE: 70 BPM | TEMPERATURE: 98.6 F | HEIGHT: 71 IN | WEIGHT: 236.5 LBS | BODY MASS INDEX: 33.11 KG/M2 | RESPIRATION RATE: 20 BRPM | OXYGEN SATURATION: 97 % | DIASTOLIC BLOOD PRESSURE: 80 MMHG | SYSTOLIC BLOOD PRESSURE: 122 MMHG

## 2023-09-21 DIAGNOSIS — Z11.59 NEED FOR HEPATITIS C SCREENING TEST: ICD-10-CM

## 2023-09-21 DIAGNOSIS — Z23 NEED FOR VACCINATION: ICD-10-CM

## 2023-09-21 DIAGNOSIS — E66.3 OVERWEIGHT: ICD-10-CM

## 2023-09-21 DIAGNOSIS — K51.219 ULCERATIVE PROCTITIS WITH COMPLICATION (HCC): ICD-10-CM

## 2023-09-21 DIAGNOSIS — E78.5 DYSLIPIDEMIA: Chronic | ICD-10-CM

## 2023-09-21 PROCEDURE — 90686 IIV4 VACC NO PRSV 0.5 ML IM: CPT | Performed by: INTERNAL MEDICINE

## 2023-09-21 PROCEDURE — 3079F DIAST BP 80-89 MM HG: CPT | Performed by: INTERNAL MEDICINE

## 2023-09-21 PROCEDURE — 90471 IMMUNIZATION ADMIN: CPT | Performed by: INTERNAL MEDICINE

## 2023-09-21 PROCEDURE — 3074F SYST BP LT 130 MM HG: CPT | Performed by: INTERNAL MEDICINE

## 2023-09-21 PROCEDURE — 99214 OFFICE O/P EST MOD 30 MIN: CPT | Mod: 25 | Performed by: INTERNAL MEDICINE

## 2023-09-21 ASSESSMENT — PATIENT HEALTH QUESTIONNAIRE - PHQ9: CLINICAL INTERPRETATION OF PHQ2 SCORE: 0

## 2023-09-21 NOTE — ASSESSMENT & PLAN NOTE
This is a chronic condition.  Recent lab test result discussed with the patient.   Latest Reference Range & Units 09/01/23 07:01   Cholesterol,Tot 100 - 199 mg/dL 249 (H)   Triglycerides 0 - 149 mg/dL 183 (H)   HDL >39 mg/dL 46   LDL Chol Calc (NIH) 0 - 99 mg/dL 169 (H)   VLDL Cholesterol Calc 5 - 40 mg/dL 34   (H): Data is abnormally high

## 2023-09-21 NOTE — PROGRESS NOTES
PRIMARY CARE CLINIC VISIT        Chief Complaint   Patient presents with    Follow-Up      Follow-up hyperlipidemia  Ulcerative colitis  Overweight  Vaccination update  Hepatitis C testing          History of Present Illness     Dyslipidemia  This is a chronic condition.  Recent lab test result discussed with the patient.   Latest Reference Range & Units 09/01/23 07:01   Cholesterol,Tot 100 - 199 mg/dL 249 (H)   Triglycerides 0 - 149 mg/dL 183 (H)   HDL >39 mg/dL 46   LDL Chol Calc (NIH) 0 - 99 mg/dL 169 (H)   VLDL Cholesterol Calc 5 - 40 mg/dL 34   (H): Data is abnormally high    Ulcerative proctitis (HCC)  Chronic condition.  The patient under treatment with Lialda 1.2 g daily and followed by GI specialist.  Patient denies nausea vomiting dysphagia or unexplained weight loss.  Patient reported that he was seen by GI specialist last month  No new recommendation made.    Overweight  Chronic condition.    Counseling on health consequences related to obesity.  Discussed with the patient regarding diet, exercise, and lifestyle modification to help achieve and maintain healthy weight          Need for hepatitis C screening test  Patient is due for hepatitis C screening test.  Lab ordered today.    Need for vaccination  Patient is due for influenza vaccine    Current Outpatient Medications on File Prior to Visit   Medication Sig Dispense Refill    mesalamine (LIALDA) 1.2 GM Tablet Delayed Response Take 1.2 g by mouth every day.       No current facility-administered medications on file prior to visit.        Allergies: Patient has no known allergies.    Current Outpatient Medications Ordered in Epic   Medication Sig Dispense Refill    mesalamine (LIALDA) 1.2 GM Tablet Delayed Response Take 1.2 g by mouth every day.       No current [facility]-ordered facility-administered medications on file.       Past Medical History:   Diagnosis Date    Left ankle injury 3/3/2014    No active medical problems        Past Surgical History:  "  Procedure Laterality Date    ORIF, ANKLE  8/19/2010    Performed by BRENDA BURTON at SURGERY [facility] ORS       Family History   Problem Relation Age of Onset    Psychiatric Illness Mother         depression    Cancer Mother         breast cancer    No Known Problems Father     No Known Problems Sister     No Known Problems Brother        Social History     Tobacco Use   Smoking Status Former   Smokeless Tobacco Never       Social History     Substance and Sexual Activity   Alcohol Use Yes    Comment: Stopped 2.5 months ago but was only socially before       Review of systems.  As per HPI above. All other systems reviewed and negative.      Past Medical, Social, and Family history reviewed and updated in EPIC     Objective     /80   Pulse 70   Temp 37 °C (98.6 °F) (Temporal)   Resp 20   Ht 1.803 m (5' 11\")   Wt 107 kg (236 lb 8 oz)   SpO2 97%    Body mass index is 32.99 kg/m².    General: alert in no apparent distress.  Cardiovascular: regular rate and rhythm  Pulmonary: lungs : no wheezing   Gastrointestinal: BS present. No obvious mass noted        Lab Results   Component Value Date/Time    HBA1C 5.4 03/04/2022 08:49 AM       Lab Results   Component Value Date/Time    WBC 5.6 03/04/2022 08:49 AM    HEMOGLOBIN 16.0 03/04/2022 08:49 AM    HEMATOCRIT 47.0 03/04/2022 08:49 AM    MCV 92.0 03/04/2022 08:49 AM    PLATELETCT 237 03/04/2022 08:49 AM         Lab Results   Component Value Date/Time    SODIUM 140 03/04/2022 08:49 AM    POTASSIUM 4.5 03/04/2022 08:49 AM    GLUCOSE 91 03/04/2022 08:49 AM    BUN 14 03/04/2022 08:49 AM    CREATININE 0.86 03/04/2022 08:49 AM       Lab Results   Component Value Date/Time    CHOLSTRLTOT 249 (H) 09/01/2023 07:01 AM    CHOLSTRLTOT 222 (H) 03/04/2022 08:49 AM    TRIGLYCERIDE 183 (H) 09/01/2023 07:01 AM    TRIGLYCERIDE 118 03/04/2022 08:49 AM    HDL 46 09/01/2023 07:01 AM    HDL 45 03/04/2022 08:49 AM     (H) 03/04/2022 08:49 AM       Lab Results   Component " Value Date/Time    ALTSGPT 17 03/04/2022 08:49 AM             Assessment and Plan     1. Dyslipidemia  Chronic condition.  Uncontrolled.  Recent lab test result discussed with the patient.  At this time patient not interested in taking medication.  Recommend diet and exercise.  Recommend to follow-up again in approximately 3 to 4 months to repeat the lab test.    2. Ulcerative proctitis with complication (HCC)  Chronic stable condition.  Continue with mesalamine 1.2 g daily.  Continue follow-up with GI specialist as directed    3. Overweight  Chronic condition.  Unstable.  Recommend healthy diet and exercise.  Encouraged patient to lose weight.    4. Need for vaccination  - INFLUENZA VACCINE QUAD INJ (PF)    5. Need for hepatitis C screening test  - HEP C VIRUS ANTIBODY; Future      Attestation: I spent: 33   min -  That includes time for chart review before the visit, the actual patient visit, and time spent on documentation in EMR after the visit.  Chart review/prep, review of other providers' records, imaging/lab review, face-to-face time for history/examination, pt's counseling/education, ordering, prescribing,  review of results/meds/ treatment plan with patient, and care coordination.               Please note that this dictation was created using voice recognition software. I have made every reasonable attempt to correct obvious errors, but I expect that there are errors of grammar and possibly content that I did not discover before finalizing the note.    He Zaldivar MD  Internal Medicine  [facility]

## 2023-09-21 NOTE — ASSESSMENT & PLAN NOTE
Chronic condition.  The patient under treatment with Lialda 1.2 g daily and followed by GI specialist.  Patient denies nausea vomiting dysphagia or unexplained weight loss.  Patient reported that he was seen by GI specialist last month  No new recommendation made.

## 2023-09-30 LAB — HCV IGG SERPL QL IA: NON REACTIVE

## 2024-08-29 ENCOUNTER — OFFICE VISIT (OUTPATIENT)
Dept: MEDICAL GROUP | Facility: PHYSICIAN GROUP | Age: 29
End: 2024-08-29
Payer: COMMERCIAL

## 2024-08-29 VITALS
RESPIRATION RATE: 16 BRPM | OXYGEN SATURATION: 98 % | SYSTOLIC BLOOD PRESSURE: 120 MMHG | HEIGHT: 71 IN | DIASTOLIC BLOOD PRESSURE: 64 MMHG | WEIGHT: 232 LBS | BODY MASS INDEX: 32.48 KG/M2 | TEMPERATURE: 98.3 F | HEART RATE: 60 BPM

## 2024-08-29 DIAGNOSIS — E66.3 OVERWEIGHT: ICD-10-CM

## 2024-08-29 DIAGNOSIS — Z11.4 SCREENING FOR HIV (HUMAN IMMUNODEFICIENCY VIRUS): ICD-10-CM

## 2024-08-29 DIAGNOSIS — E78.5 DYSLIPIDEMIA: Chronic | ICD-10-CM

## 2024-08-29 DIAGNOSIS — Z00.00 ANNUAL PHYSICAL EXAM: ICD-10-CM

## 2024-08-29 DIAGNOSIS — K51.219 ULCERATIVE PROCTITIS WITH COMPLICATION (HCC): ICD-10-CM

## 2024-08-29 PROBLEM — Z76.89 ENCOUNTER TO ESTABLISH CARE WITH NEW DOCTOR: Status: RESOLVED | Noted: 2022-03-02 | Resolved: 2024-08-29

## 2024-08-29 PROBLEM — K51.20 ULCERATIVE PROCTITIS (HCC): Chronic | Status: ACTIVE | Noted: 2022-03-02

## 2024-08-29 ASSESSMENT — PATIENT HEALTH QUESTIONNAIRE - PHQ9: CLINICAL INTERPRETATION OF PHQ2 SCORE: 0

## 2024-08-29 NOTE — PROGRESS NOTES
PRIMARY CARE CLINIC VISIT    Chief complaint:    Pt is here for Annual Exam      History of Present Illness     Ulcerative proctitis (HCC)  This is a chronic condition.  The patient has been taking Lialda 1.2 g daily.  Patient followed by GI specialist.  Patient denies nausea vomiting or GI bleeding.  He denies abdominal pain.    Dyslipidemia  Chronic condition.  Patient presently on diet therapy.  Patient is due for lab test.    Overweight  Chronic condition.  Discussed with the patient regarding diet, exercise, and lifestyle modification to help achieve and maintain healthy weight           Allergies: Patient has no known allergies.    Current Outpatient Medications Ordered in Epic   Medication Sig Dispense Refill    mesalamine (LIALDA) 1.2 GM Tablet Delayed Response Take 1.2 g by mouth every day. (Patient not taking: Reported on 8/29/2024)       No current [facility]-ordered facility-administered medications on file.       Past Medical History:   Diagnosis Date    Left ankle injury 3/3/2014    No active medical problems        Past Surgical History:   Procedure Laterality Date    ORIF, ANKLE  8/19/2010    Performed by BRENDA BURTON at SURGERY [facility] ORS       Family History   Problem Relation Age of Onset    Psychiatric Illness Mother         depression    Cancer Mother         breast cancer    No Known Problems Father     No Known Problems Sister     No Known Problems Brother        Social History     Tobacco Use   Smoking Status Former   Smokeless Tobacco Never       Social History     Substance and Sexual Activity   Alcohol Use Yes    Comment: Stopped 2.5 months ago but was only socially before       Review of systems:  As per HPI above. All other systems reviewed and negative.      Past Medical, Social, and Family history reviewed and updated in EPIC     Objective     Lab Results   Component Value Date/Time    HBA1C 5.4 03/04/2022 08:49 AM       Lab Results   Component Value Date/Time    WBC 5.6 03/04/2022  "08:49 AM    HEMOGLOBIN 16.0 03/04/2022 08:49 AM    HEMATOCRIT 47.0 03/04/2022 08:49 AM    MCV 92.0 03/04/2022 08:49 AM    PLATELETCT 237 03/04/2022 08:49 AM         Lab Results   Component Value Date/Time    SODIUM 140 03/04/2022 08:49 AM    POTASSIUM 4.5 03/04/2022 08:49 AM    GLUCOSE 91 03/04/2022 08:49 AM    BUN 14 03/04/2022 08:49 AM    CREATININE 0.86 03/04/2022 08:49 AM       Lab Results   Component Value Date/Time    CHOLSTRLTOT 249 (H) 09/01/2023 07:01 AM    CHOLSTRLTOT 222 (H) 03/04/2022 08:49 AM     (H) 03/04/2022 08:49 AM    HDL 46 09/01/2023 07:01 AM    HDL 45 03/04/2022 08:49 AM    TRIGLYCERIDE 183 (H) 09/01/2023 07:01 AM    TRIGLYCERIDE 118 03/04/2022 08:49 AM       Lab Results   Component Value Date/Time    ALTSGPT 17 03/04/2022 08:49 AM       -------------------------------------------------------------------------------------------    /64 (BP Location: Left arm, Patient Position: Sitting, BP Cuff Size: Large adult)   Pulse 60   Temp 36.8 °C (98.3 °F) (Temporal)   Resp 16   Ht 1.803 m (5' 11\")   Wt 105 kg (232 lb)   SpO2 98%   Body mass index is 32.36 kg/m².    General: alert in no apparent distress.  Cardiovascular: regular rate and rhythm  Pulmonary: lungs : no wheezing   Gastrointestinal: BS present.   Tenderness with no purulent drainage  Oropharynx no exudate  Cranial nerves II to XII grossly intact    Assessment and Plan     1. Annual physical exam  - HEMOGLOBIN A1C; Future  - ALANINE AMINO-TRANS; Future  - Basic Metabolic Panel; Future  - CBC WITH DIFFERENTIAL; Future  - Lipid Profile; Future  - TSH; Future  - MICROALBUMIN CREAT RATIO URINE; Future  - VITAMIN D,25 HYDROXY (DEFICIENCY); Future  Routine lab requested.  Advised the patient to follow-up few days after lab test done.    2. Ulcerative proctitis with complication (HCC)  Chronic stable condition.  Continue with Lialda 1.2 g daily.  Continue follow-up with GI service as directed.  Currently the patient " asymptomatic    3. Dyslipidemia  Chronic condition.  Current status unclear.  Lab test ordered to check lipid panel.  Recommend to continue with low-fat low-cholesterol diet.  Continue to monitor    4. Overweight  Chronic condition.  Uncontrolled.  Recommend diet and lifestyle modification.  Encouraged patient to lose weight.    5. Screening for HIV (human immunodeficiency virus)  - HIV AG/AB COMBO ASSAY SCREENING; Future                  ANTICIPATORY GUIDANCE  Patient Counseling:  -Cholesterol screening. Fasting lipid panel  -Diabetes screening. Fasting blood sugar  -Diet: advised lean meats, fruits, vegetables, whole grains  -Discussed Healthful lifestyle measures. Pt to avoid tobacco, ETOH, and drug use.  -Pt to continue with regular exercise/walking activities  -Advised sun protection, sunscreen use  -Injury prevention: discussed safety seat belts  -Discussed preventive immunizations  -Recommend patient to schedule a yearly eye examination and dental exam                         Please note that this dictation was created using voice recognition software. I have made every reasonable attempt to correct obvious errors, but I expect that there are errors of grammar and possibly content that I did not discover before finalizing the note.    He Zaldivar MD  Internal Medicine  [place] [facility]

## 2024-08-29 NOTE — ASSESSMENT & PLAN NOTE
This is a chronic condition.  The patient has been taking Lialda 1.2 g daily.  Patient followed by GI specialist.  Patient denies nausea vomiting or GI bleeding.  He denies abdominal pain.

## 2024-09-05 PROBLEM — E55.9 VITAMIN D DEFICIENCY: Status: ACTIVE | Noted: 2024-09-05

## 2024-09-05 PROBLEM — R73.03 PREDIABETES: Chronic | Status: ACTIVE | Noted: 2024-09-05

## 2024-09-05 PROBLEM — E55.9 VITAMIN D DEFICIENCY: Chronic | Status: ACTIVE | Noted: 2024-09-05

## 2024-09-05 PROBLEM — R73.03 PREDIABETES: Status: ACTIVE | Noted: 2024-09-05

## 2024-09-05 LAB
25(OH)D3+25(OH)D2 SERPL-MCNC: 22.1 NG/ML (ref 30–100)
ALBUMIN/CREAT UR: 21 MG/G CREAT (ref 0–29)
ALT SERPL-CCNC: 20 IU/L (ref 0–44)
BASOPHILS # BLD AUTO: 0 X10E3/UL (ref 0–0.2)
BASOPHILS NFR BLD AUTO: 1 %
BUN SERPL-MCNC: 18 MG/DL (ref 6–20)
BUN/CREAT SERPL: 17 (ref 9–20)
CALCIUM SERPL-MCNC: 9.6 MG/DL (ref 8.7–10.2)
CHLORIDE SERPL-SCNC: 100 MMOL/L (ref 96–106)
CHOLEST SERPL-MCNC: 212 MG/DL (ref 100–199)
CO2 SERPL-SCNC: 23 MMOL/L (ref 20–29)
CREAT SERPL-MCNC: 1.08 MG/DL (ref 0.76–1.27)
CREAT UR-MCNC: 317.6 MG/DL
EGFRCR SERPLBLD CKD-EPI 2021: 94 ML/MIN/1.73
EOSINOPHIL # BLD AUTO: 0.3 X10E3/UL (ref 0–0.4)
EOSINOPHIL NFR BLD AUTO: 4 %
ERYTHROCYTE [DISTWIDTH] IN BLOOD BY AUTOMATED COUNT: 12.4 % (ref 11.6–15.4)
GLUCOSE SERPL-MCNC: 87 MG/DL (ref 70–99)
HBA1C MFR BLD: 5.7 % (ref 4.8–5.6)
HCT VFR BLD AUTO: 50.6 % (ref 37.5–51)
HDLC SERPL-MCNC: 44 MG/DL
HGB BLD-MCNC: 16.6 G/DL (ref 13–17.7)
HIV 1+2 AB+HIV1 P24 AG SERPL QL IA: NON REACTIVE
IMM GRANULOCYTES # BLD AUTO: 0 X10E3/UL (ref 0–0.1)
IMM GRANULOCYTES NFR BLD AUTO: 0 %
IMMATURE CELLS  115398: NORMAL
LDL CALC COMMENT:: ABNORMAL
LDLC SERPL CALC-MCNC: 149 MG/DL (ref 0–99)
LYMPHOCYTES # BLD AUTO: 1.4 X10E3/UL (ref 0.7–3.1)
LYMPHOCYTES NFR BLD AUTO: 21 %
MCH RBC QN AUTO: 31.3 PG (ref 26.6–33)
MCHC RBC AUTO-ENTMCNC: 32.8 G/DL (ref 31.5–35.7)
MCV RBC AUTO: 96 FL (ref 79–97)
MICROALBUMIN UR-MCNC: 66.1 UG/ML
MONOCYTES # BLD AUTO: 0.7 X10E3/UL (ref 0.1–0.9)
MONOCYTES NFR BLD AUTO: 11 %
MORPHOLOGY BLD-IMP: NORMAL
NEUTROPHILS # BLD AUTO: 4.1 X10E3/UL (ref 1.4–7)
NEUTROPHILS NFR BLD AUTO: 63 %
NRBC BLD AUTO-RTO: NORMAL %
PLATELET # BLD AUTO: 271 X10E3/UL (ref 150–450)
POTASSIUM SERPL-SCNC: 4.5 MMOL/L (ref 3.5–5.2)
RBC # BLD AUTO: 5.3 X10E6/UL (ref 4.14–5.8)
SODIUM SERPL-SCNC: 138 MMOL/L (ref 134–144)
TRIGL SERPL-MCNC: 104 MG/DL (ref 0–149)
TSH SERPL DL<=0.005 MIU/L-ACNC: 1.08 UIU/ML (ref 0.45–4.5)
VLDLC SERPL CALC-MCNC: 19 MG/DL (ref 5–40)
WBC # BLD AUTO: 6.5 X10E3/UL (ref 3.4–10.8)

## 2025-04-21 ENCOUNTER — APPOINTMENT (OUTPATIENT)
Dept: MEDICAL GROUP | Facility: PHYSICIAN GROUP | Age: 30
End: 2025-04-21
Payer: COMMERCIAL

## 2025-04-21 VITALS
OXYGEN SATURATION: 97 % | HEART RATE: 60 BPM | HEIGHT: 71 IN | RESPIRATION RATE: 16 BRPM | DIASTOLIC BLOOD PRESSURE: 72 MMHG | BODY MASS INDEX: 30.07 KG/M2 | WEIGHT: 214.8 LBS | SYSTOLIC BLOOD PRESSURE: 120 MMHG | TEMPERATURE: 98.6 F

## 2025-04-21 DIAGNOSIS — E66.3 OVERWEIGHT: ICD-10-CM

## 2025-04-21 DIAGNOSIS — E78.5 DYSLIPIDEMIA: Chronic | ICD-10-CM

## 2025-04-21 DIAGNOSIS — K51.219 ULCERATIVE PROCTITIS WITH COMPLICATION (HCC): Chronic | ICD-10-CM

## 2025-04-21 DIAGNOSIS — R73.03 PREDIABETES: Chronic | ICD-10-CM

## 2025-04-21 DIAGNOSIS — E55.9 VITAMIN D DEFICIENCY: Chronic | ICD-10-CM

## 2025-04-21 PROBLEM — Z11.59 NEED FOR HEPATITIS C SCREENING TEST: Status: RESOLVED | Noted: 2023-09-21 | Resolved: 2025-04-21

## 2025-04-21 PROCEDURE — 3078F DIAST BP <80 MM HG: CPT | Performed by: INTERNAL MEDICINE

## 2025-04-21 PROCEDURE — 3074F SYST BP LT 130 MM HG: CPT | Performed by: INTERNAL MEDICINE

## 2025-04-21 PROCEDURE — 99214 OFFICE O/P EST MOD 30 MIN: CPT | Performed by: INTERNAL MEDICINE

## 2025-04-21 RX ORDER — VITAMIN B COMPLEX
1000 TABLET ORAL DAILY
COMMUNITY

## 2025-04-21 ASSESSMENT — PATIENT HEALTH QUESTIONNAIRE - PHQ9: CLINICAL INTERPRETATION OF PHQ2 SCORE: 0

## 2025-04-21 NOTE — PROGRESS NOTES
PRIMARY CARE CLINIC VISIT        Chief Complaint   Patient presents with    Follow-Up    Requesting Labs      Ulcerative proctitis  Hyperlipidemia  Follow-up prediabetes  Vitamin D deficiency  Overweight        History of Present Illness     Ulcerative proctitis (HCC)  Chronic condition.  Patient presently followed by GI service.  Patient is presently taking Lialda 1.2 g daily.  No new symptoms reported.    Dyslipidemia  Chronic condition.  The patient currently on diet therapy.  Patient is due for lab test.    Prediabetes  Chronic issue.  The patient currently on diet therapy.  Patient is due for lab test    Vitamin D deficiency  Chronic issue.  Patient presently  taking vitamin D.  Lab test was done for follow-up.    Overweight  Body mass index is 29.96 kg/m².   Chronic condition.  Recommend pt to follow a healthy , well balance diet  Pt to continue with regular exercise activity and to maintain ideal weight.        Current Outpatient Medications on File Prior to Visit   Medication Sig Dispense Refill    vitamin D3 (CHOLECALCIFEROL) 1000 Unit (25 mcg) Tab Take 1,000 Units by mouth every day.      mesalamine (LIALDA) 1.2 GM Tablet Delayed Response Take 1.2 g by mouth every day.       No current facility-administered medications on file prior to visit.        Allergies: Patient has no known allergies.    Current Outpatient Medications Ordered in Epic   Medication Sig Dispense Refill    vitamin D3 (CHOLECALCIFEROL) 1000 Unit (25 mcg) Tab Take 1,000 Units by mouth every day.      mesalamine (LIALDA) 1.2 GM Tablet Delayed Response Take 1.2 g by mouth every day.       No current [facility]-ordered facility-administered medications on file.       Past Medical History:   Diagnosis Date    Left ankle injury 3/3/2014    No active medical problems        Past Surgical History:   Procedure Laterality Date    ORIF, ANKLE  8/19/2010    Performed by BRENDA BURTON at SURGERY [facility]       Family History   Problem Relation  Age of Onset    Psychiatric Illness Mother         depression    Cancer Mother         breast cancer    No Known Problems Father     No Known Problems Sister     No Known Problems Brother        Social History     Tobacco Use   Smoking Status Former   Smokeless Tobacco Never       Social History     Substance and Sexual Activity   Alcohol Use Yes    Comment: Stopped 2.5 months ago but was only socially before       Review of systems  As per HPI above. All other systems reviewed and negative.      Past Medical, Social, and Family history reviewed and updated in [facility]       LAB DATA:     I have independently reviewed / interpreted labs    Lab Results   Component Value Date/Time    HBA1C 5.7 (H) 08/31/2024 08:00 AM    HBA1C 5.4 03/04/2022 08:49 AM        Lab Results   Component Value Date/Time    WBC 6.5 08/31/2024 08:00 AM    WBC 5.6 03/04/2022 08:49 AM    HEMOGLOBIN 16.6 08/31/2024 08:00 AM    HEMOGLOBIN 16.0 03/04/2022 08:49 AM    HEMATOCRIT 50.6 08/31/2024 08:00 AM    HEMATOCRIT 47.0 03/04/2022 08:49 AM    MCV 96 08/31/2024 08:00 AM    MCV 92.0 03/04/2022 08:49 AM    PLATELETCT 271 08/31/2024 08:00 AM    PLATELETCT 237 03/04/2022 08:49 AM       Lab Results   Component Value Date/Time    SODIUM 138 08/31/2024 08:00 AM    SODIUM 140 03/04/2022 08:49 AM    POTASSIUM 4.5 08/31/2024 08:00 AM    POTASSIUM 4.5 03/04/2022 08:49 AM    GLUCOSE 87 08/31/2024 08:00 AM    GLUCOSE 91 03/04/2022 08:49 AM    BUN 18 08/31/2024 08:00 AM    BUN 14 03/04/2022 08:49 AM    CREATININE 1.08 08/31/2024 08:00 AM    CREATININE 0.86 03/04/2022 08:49 AM       Lab Results   Component Value Date/Time    CHOLSTRLTOT 212 (H) 08/31/2024 08:00 AM    CHOLSTRLTOT 222 (H) 03/04/2022 08:49 AM    TRIGLYCERIDE 104 08/31/2024 08:00 AM    TRIGLYCERIDE 118 03/04/2022 08:49 AM    HDL 44 08/31/2024 08:00 AM    HDL 45 03/04/2022 08:49 AM     (H) 03/04/2022 08:49 AM       Lab Results   Component Value Date/Time    ALTSGPT 20 08/31/2024 08:00 AM    ALTSGPT  "17 03/04/2022 08:49 AM          Objective     /72 (BP Location: Right arm, Patient Position: Sitting, BP Cuff Size: Large adult)   Pulse 60   Temp 37 °C (98.6 °F) (Temporal)   Resp 16   Ht 1.803 m (5' 11\")   Wt 97.4 kg (214 lb 12.8 oz)   SpO2 97%    Body mass index is 29.96 kg/m².    General: alert in no apparent distress.  Cardiovascular: regular rate and rhythm  Pulmonary: lungs : no wheezing   Gastrointestinal: BS present.       Assessment and Plan     1. Ulcerative proctitis with complication (HCC)  Chronic issue.  Stable.  Continue Lialda 1.2 g daily  Continue follow-up with GI specialist.  Patient reported that he had colonoscopy done a month ago and no acute abnormality noted.    2. Dyslipidemia  - Lipid Profile; Future  Chronic issue.  Current status unclear.  Lipid panel requested.  Continue with diet and lifestyle modification    3. Prediabetes  - HEMOGLOBIN A1C; Future  - Basic Metabolic Panel; Future  - MICROALBUMIN CREAT RATIO URINE; Future   chronic issue.  Current status unclear.  Lab test ordered to check A1c.    4. Vitamin D deficiency  - VITAMIN D,25 HYDROXY (DEFICIENCY); Future  Chronic issue.  Current status unclear.  Blood test ordered to check vitamin D.    5. Overweight  Chronic issue.  Recommend diet lifestyle modification.  Encourage patient to maintain ideal weight  Other orders                      Please note that this dictation was created using voice recognition software. I have made every reasonable attempt to correct obvious errors, but I expect that there are errors of grammar and possibly content that I did not discover before finalizing the note.    He Zaldivar MD  Internal Medicine  [place] [facility]  "

## 2025-04-21 NOTE — ASSESSMENT & PLAN NOTE
Body mass index is 29.96 kg/m².   Chronic condition.  Recommend pt to follow a healthy , well balance diet  Pt to continue with regular exercise activity and to maintain ideal weight.

## 2025-04-21 NOTE — ASSESSMENT & PLAN NOTE
Chronic condition.  Patient presently followed by GI service.  Patient is presently taking Lialda 1.2 g daily.  No new symptoms reported.

## 2025-08-12 LAB
25(OH)D3+25(OH)D2 SERPL-MCNC: 23.9 NG/ML (ref 30–100)
ALBUMIN/CREAT UR: <3 MG/G CREAT (ref 0–29)
BUN SERPL-MCNC: 19 MG/DL (ref 6–20)
BUN/CREAT SERPL: 19 (ref 9–20)
CALCIUM SERPL-MCNC: 9.9 MG/DL (ref 8.7–10.2)
CHLORIDE SERPL-SCNC: 104 MMOL/L (ref 96–106)
CHOLEST SERPL-MCNC: 245 MG/DL (ref 100–199)
CO2 SERPL-SCNC: 22 MMOL/L (ref 20–29)
CREAT SERPL-MCNC: 0.98 MG/DL (ref 0.76–1.27)
CREAT UR-MCNC: 113.7 MG/DL
EGFRCR SERPLBLD CKD-EPI 2021: 105 ML/MIN/1.73
GLUCOSE SERPL-MCNC: 87 MG/DL (ref 70–99)
HBA1C MFR BLD: 5.5 % (ref 4.8–5.6)
HDLC SERPL-MCNC: 51 MG/DL
LDL CALC COMMENT:: ABNORMAL
LDLC SERPL CALC-MCNC: 172 MG/DL (ref 0–99)
MICROALBUMIN UR-MCNC: <3 UG/ML
POTASSIUM SERPL-SCNC: 4.6 MMOL/L (ref 3.5–5.2)
SODIUM SERPL-SCNC: 140 MMOL/L (ref 134–144)
TRIGL SERPL-MCNC: 124 MG/DL (ref 0–149)
VLDLC SERPL CALC-MCNC: 22 MG/DL (ref 5–40)

## 2025-08-13 RX ORDER — ROSUVASTATIN CALCIUM 20 MG/1
20 TABLET, COATED ORAL EVERY EVENING
Qty: 90 TABLET | Refills: 3 | Status: SHIPPED | OUTPATIENT
Start: 2025-08-13